# Patient Record
Sex: FEMALE | Race: WHITE | Employment: FULL TIME | URBAN - METROPOLITAN AREA
[De-identification: names, ages, dates, MRNs, and addresses within clinical notes are randomized per-mention and may not be internally consistent; named-entity substitution may affect disease eponyms.]

---

## 2020-05-24 ENCOUNTER — APPOINTMENT (OUTPATIENT)
Dept: GENERAL RADIOLOGY | Age: 40
End: 2020-05-24
Attending: PHYSICIAN ASSISTANT
Payer: COMMERCIAL

## 2020-05-24 ENCOUNTER — HOSPITAL ENCOUNTER (EMERGENCY)
Age: 40
Discharge: HOME | End: 2020-05-24
Attending: STUDENT IN AN ORGANIZED HEALTH CARE EDUCATION/TRAINING PROGRAM
Payer: COMMERCIAL

## 2020-05-24 VITALS
DIASTOLIC BLOOD PRESSURE: 82 MMHG | WEIGHT: 158.73 LBS | BODY MASS INDEX: 27.1 KG/M2 | HEIGHT: 64 IN | SYSTOLIC BLOOD PRESSURE: 138 MMHG | OXYGEN SATURATION: 98 % | HEART RATE: 78 BPM | TEMPERATURE: 99.9 F | RESPIRATION RATE: 16 BRPM

## 2020-05-24 DIAGNOSIS — M62.830 MUSCLE SPASM OF BACK: Primary | ICD-10-CM

## 2020-05-24 LAB — HCG UR QL: NEGATIVE

## 2020-05-24 PROCEDURE — 74011250636 HC RX REV CODE- 250/636: Performed by: PHYSICIAN ASSISTANT

## 2020-05-24 PROCEDURE — 74011250637 HC RX REV CODE- 250/637: Performed by: PHYSICIAN ASSISTANT

## 2020-05-24 PROCEDURE — 81025 URINE PREGNANCY TEST: CPT

## 2020-05-24 PROCEDURE — 99283 EMERGENCY DEPT VISIT LOW MDM: CPT

## 2020-05-24 PROCEDURE — 96372 THER/PROPH/DIAG INJ SC/IM: CPT

## 2020-05-24 PROCEDURE — 71046 X-RAY EXAM CHEST 2 VIEWS: CPT

## 2020-05-24 RX ORDER — KETOROLAC TROMETHAMINE 30 MG/ML
30 INJECTION, SOLUTION INTRAMUSCULAR; INTRAVENOUS
Status: COMPLETED | OUTPATIENT
Start: 2020-05-24 | End: 2020-05-24

## 2020-05-24 RX ORDER — NAPROXEN 500 MG/1
500 TABLET ORAL 2 TIMES DAILY WITH MEALS
Qty: 20 TAB | Refills: 0 | Status: SHIPPED | OUTPATIENT
Start: 2020-05-24 | End: 2020-06-03

## 2020-05-24 RX ORDER — METHOCARBAMOL 750 MG/1
750 TABLET, FILM COATED ORAL 3 TIMES DAILY
Qty: 21 TAB | Refills: 0 | Status: SHIPPED | OUTPATIENT
Start: 2020-05-24 | End: 2020-05-31

## 2020-05-24 RX ORDER — METHOCARBAMOL 500 MG/1
750 TABLET, FILM COATED ORAL ONCE
Status: COMPLETED | OUTPATIENT
Start: 2020-05-24 | End: 2020-05-24

## 2020-05-24 RX ORDER — LIDOCAINE 4 G/100G
PATCH TOPICAL
Qty: 10 PATCH | Refills: 0 | Status: SHIPPED | OUTPATIENT
Start: 2020-05-24

## 2020-05-24 RX ADMIN — KETOROLAC TROMETHAMINE 30 MG: 30 INJECTION, SOLUTION INTRAMUSCULAR at 17:08

## 2020-05-24 RX ADMIN — METHOCARBAMOL TABLETS 750 MG: 500 TABLET, COATED ORAL at 17:07

## 2020-05-24 NOTE — ED NOTES
The pt is discharged home with follow-up instructions. The pt received 3 prescriptions. The pt was discharged home by [facility]. The pt verbalizes understanding of the discharge instructions.

## 2020-05-24 NOTE — ED TRIAGE NOTES
Pt reports that she was carrying a dog down the stairs and fell on her back on the second stair. Pt presents with right sided back pain.

## 2020-05-24 NOTE — ED PROVIDER NOTES
Date of Service:  2020    Patient:  Thomas Knowles    Chief Complaint:  Fall and Back Pain       HPI:  Thomas Knowles is a 46 y.o.  female who presents for evaluation of right lower back pain s/p fall down 2 steps earlier today. Fell straight onto right lower back. Endorses spasm, worse with any twisting/bending/change in position. No midline tenderness, no numbness/tingling/weakness, no radiation of pain, no bowel/bladder incontinence, no saddle anesthesia. No chest pain, no sob. Will be in car x 6 hours tomorrow. No past medical history on file. Past Surgical History:   Procedure Laterality Date    HX  SECTION           No family history on file. Social History     Socioeconomic History    Marital status: Not on file     Spouse name: Not on file    Number of children: Not on file    Years of education: Not on file    Highest education level: Not on file   Occupational History    Not on file   Social Needs    Financial resource strain: Not on file    Food insecurity     Worry: Not on file     Inability: Not on file    Transportation needs     Medical: Not on file     Non-medical: Not on file   Tobacco Use    Smoking status: Never Smoker    Smokeless tobacco: Never Used   Substance and Sexual Activity    Alcohol use:  Yes    Drug use: Not on file    Sexual activity: Not on file   Lifestyle    Physical activity     Days per week: Not on file     Minutes per session: Not on file    Stress: Not on file   Relationships    Social connections     Talks on phone: Not on file     Gets together: Not on file     Attends Orthodox service: Not on file     Active member of club or organization: Not on file     Attends meetings of clubs or organizations: Not on file     Relationship status: Not on file    Intimate partner violence     Fear of current or ex partner: Not on file     Emotionally abused: Not on file     Physically abused: Not on file     Forced sexual activity: Not on file   Other Topics Concern    Not on file   Social History Narrative    Not on file         ALLERGIES: Patient has no known allergies. Review of Systems   Constitutional: Negative for chills and fever. HENT: Negative for congestion and sore throat. Eyes: Negative for pain. Respiratory: Negative for cough and shortness of breath. Cardiovascular: Negative for chest pain and palpitations. Gastrointestinal: Negative for abdominal pain, diarrhea, nausea and vomiting. Genitourinary: Negative for dysuria, frequency and urgency. Musculoskeletal: Positive for back pain. Negative for neck pain. Neurological: Negative for dizziness, weakness, light-headedness, numbness and headaches. Vitals:    05/24/20 1651   BP: (!) 151/106   Pulse: 81   Resp: 16   Temp: 99.9 °F (37.7 °C)   SpO2: 99%   Weight: 72 kg (158 lb 11.7 oz)   Height: 5' 3.5\" (1.613 m)            Physical Exam  Vitals signs and nursing note reviewed. Constitutional:       Appearance: Normal appearance. HENT:      Head: Normocephalic and atraumatic. Eyes:      Extraocular Movements: Extraocular movements intact. Conjunctiva/sclera: Conjunctivae normal.      Pupils: Pupils are equal, round, and reactive to light. Neck:      Musculoskeletal: Normal range of motion and neck supple. No crepitus, injury or spinous process tenderness. Cardiovascular:      Rate and Rhythm: Normal rate and regular rhythm. Pulses: Normal pulses. Heart sounds: Normal heart sounds. Pulmonary:      Effort: Pulmonary effort is normal. No respiratory distress. Breath sounds: Normal breath sounds. Abdominal:      General: Abdomen is flat. Bowel sounds are normal.      Palpations: Abdomen is soft. Musculoskeletal:      Comments: No c/t/l midline tenderness, no crepitus or deformity, nontender, atraumatic extremities, right lower back paraspinous muscle TTP, spasm noted   Skin:     General: Skin is warm and dry.       Capillary Refill: Capillary refill takes less than 2 seconds. Findings: No rash. Neurological:      General: No focal deficit present. Mental Status: She is alert and oriented to person, place, and time. Sensory: Sensation is intact. Motor: Motor function is intact. Coordination: Coordination is intact. Deep Tendon Reflexes: Reflexes are normal and symmetric. Psychiatric:         Mood and Affect: Mood normal.         Behavior: Behavior normal.          MDM  Number of Diagnoses or Management Options  Muscle spasm of back:   Diagnosis management comments: VITAL SIGNS:  Empty flowsheet group. LABS:  Recent Results (from the past 6 hour(s))  -HCG URINE, QL. - POC  Collection Time: 05/24/20  5:31 PM       Result                      Value             Ref Range           Pregnancy test,urine (*     Negative          NEG               IMAGING:  XR CHEST PA LAT   Final Result    IMPRESSION: No acute cardiopulmonary disease. Medications During Visit:  Medications  ketorolac (TORADOL) injection 30 mg (30 mg IntraMUSCular Given 5/24/20 1870)   methocarbamoL (ROBAXIN) tablet 750 mg (750 mg Oral Given 5/24/20 1706)      DECISION MAKING:  Satya Brand is a 46 y.o. female who comes in as above. Right lower back spasm after falling down 2 days earlier today. No neurological symptoms, no radiation of pain, no bowel/bladder incontinence, no midline tenderness, no SOB/CP. CXR negative for acute processes. Given toradol and robaxin in ED with improvement of pain. Prescribed robaxin, naprosyn, and lidocaine patch, discussed gentle movement as tolerated. Follow up with PCP as needed in 1 week. Return precautions given. IMPRESSION:  Muscle spasm of back  (primary encounter diagnosis)    DISPOSITION:  Discharged      Current Discharge Medication List    START taking these medications    methocarbamoL (Robaxin-750) 750 mg tablet  Take 1 Tab by mouth three (3) times daily for 7 days.   Qty: 21 Tab Refills: 0    naproxen (Naprosyn) 500 mg tablet  Take 1 Tab by mouth two (2) times daily (with meals) for 10 days. Qty: 20 Tab Refills: 0    lidocaine 4 % patch  Apply patch Q12 hours prn for pain  Qty: 10 Patch Refills: 0           Follow-up Information     Follow up With Specialties Details Why Contact Info    Jihan, [address] [facility] Schedule an appointment as soon as   possible for a visit in 1 week As needed [address] [facility] [address] [place] [address] DEPT Emergency Medicine Go to  If symptoms worsen [facility] [address] [facility]  [phone]          The patient is asked to follow-up with their primary care provider in the next several days. They are to call tomorrow for an appointment. The patient is asked to return promptly for any increased concerns or worsening of symptoms. They can return to this emergency department or any other emergency department. ED Course as of May 24 1756   Sun May 24, 2020   1755 Patient sitting comfortably, pain has improved. No complaints at this time.      [EK]      ED Course User Index  [EK] Valentino Line, PA-C       Procedures